# Patient Record
Sex: MALE | Race: WHITE | Employment: FULL TIME | URBAN - METROPOLITAN AREA
[De-identification: names, ages, dates, MRNs, and addresses within clinical notes are randomized per-mention and may not be internally consistent; named-entity substitution may affect disease eponyms.]

---

## 2017-01-11 PROBLEM — M75.20 BICEPS TENDONITIS: Status: ACTIVE | Noted: 2017-01-11

## 2018-11-05 ENCOUNTER — OFFICE VISIT (OUTPATIENT)
Dept: FAMILY MEDICINE CLINIC | Age: 38
End: 2018-11-05
Payer: COMMERCIAL

## 2018-11-05 ENCOUNTER — HOSPITAL ENCOUNTER (OUTPATIENT)
Age: 38
Discharge: HOME | End: 2018-11-07
Payer: COMMERCIAL

## 2018-11-05 VITALS
RESPIRATION RATE: 16 BRPM | BODY MASS INDEX: 27.9 KG/M2 | SYSTOLIC BLOOD PRESSURE: 124 MMHG | OXYGEN SATURATION: 97 % | HEART RATE: 73 BPM | DIASTOLIC BLOOD PRESSURE: 90 MMHG | WEIGHT: 173.6 LBS | HEIGHT: 66 IN | TEMPERATURE: 97.2 F

## 2018-11-05 DIAGNOSIS — M25.542 ARTHRALGIA OF HANDS, BILATERAL: Primary | ICD-10-CM

## 2018-11-05 DIAGNOSIS — Z13.220 SCREENING, LIPID: ICD-10-CM

## 2018-11-05 DIAGNOSIS — M25.541 ARTHRALGIA OF HANDS, BILATERAL: ICD-10-CM

## 2018-11-05 DIAGNOSIS — M25.542 ARTHRALGIA OF HANDS, BILATERAL: ICD-10-CM

## 2018-11-05 DIAGNOSIS — M25.541 ARTHRALGIA OF HANDS, BILATERAL: Primary | ICD-10-CM

## 2018-11-05 LAB
ALBUMIN SERPL-MCNC: 4.4 G/DL (ref 3.5–5.2)
ALP BLD-CCNC: 55 U/L (ref 40–129)
ALT SERPL-CCNC: 36 U/L (ref 0–40)
ANION GAP SERPL CALCULATED.3IONS-SCNC: 15 MMOL/L (ref 7–16)
AST SERPL-CCNC: 33 U/L (ref 0–39)
BILIRUB SERPL-MCNC: 0.4 MG/DL (ref 0–1.2)
BUN BLDV-MCNC: 12 MG/DL (ref 6–20)
CALCIUM SERPL-MCNC: 9.8 MG/DL (ref 8.6–10.2)
CHLORIDE BLD-SCNC: 102 MMOL/L (ref 98–107)
CHOLESTEROL, TOTAL: 190 MG/DL (ref 0–199)
CO2: 22 MMOL/L (ref 22–29)
CREAT SERPL-MCNC: 1 MG/DL (ref 0.7–1.2)
GFR AFRICAN AMERICAN: >60
GFR NON-AFRICAN AMERICAN: >60 ML/MIN/1.73
GLUCOSE BLD-MCNC: 92 MG/DL (ref 74–99)
HCT VFR BLD CALC: 47.3 % (ref 37–54)
HDLC SERPL-MCNC: 46 MG/DL
HEMOGLOBIN: 15.6 G/DL (ref 12.5–16.5)
LDL CHOLESTEROL CALCULATED: 121 MG/DL (ref 0–99)
MCH RBC QN AUTO: 29.3 PG (ref 26–35)
MCHC RBC AUTO-ENTMCNC: 33 % (ref 32–34.5)
MCV RBC AUTO: 88.7 FL (ref 80–99.9)
PDW BLD-RTO: 12.3 FL (ref 11.5–15)
PLATELET # BLD: 246 E9/L (ref 130–450)
PMV BLD AUTO: 10 FL (ref 7–12)
POTASSIUM SERPL-SCNC: 4.5 MMOL/L (ref 3.5–5)
RBC # BLD: 5.33 E12/L (ref 3.8–5.8)
RHEUMATOID FACTOR: <10 IU/ML (ref 0–13)
SEDIMENTATION RATE, ERYTHROCYTE: 2 MM/HR (ref 0–15)
SODIUM BLD-SCNC: 139 MMOL/L (ref 132–146)
TOTAL PROTEIN: 7.3 G/DL (ref 6.4–8.3)
TRIGL SERPL-MCNC: 113 MG/DL (ref 0–149)
TSH SERPL DL<=0.05 MIU/L-ACNC: 1.29 UIU/ML (ref 0.27–4.2)
VLDLC SERPL CALC-MCNC: 23 MG/DL
WBC # BLD: 5.4 E9/L (ref 4.5–11.5)

## 2018-11-05 PROCEDURE — 84443 ASSAY THYROID STIM HORMONE: CPT

## 2018-11-05 PROCEDURE — 80061 LIPID PANEL: CPT

## 2018-11-05 PROCEDURE — 85027 COMPLETE CBC AUTOMATED: CPT

## 2018-11-05 PROCEDURE — 80053 COMPREHEN METABOLIC PANEL: CPT

## 2018-11-05 PROCEDURE — 86038 ANTINUCLEAR ANTIBODIES: CPT

## 2018-11-05 PROCEDURE — 85651 RBC SED RATE NONAUTOMATED: CPT

## 2018-11-05 PROCEDURE — 86431 RHEUMATOID FACTOR QUANT: CPT

## 2018-11-05 PROCEDURE — 99203 OFFICE O/P NEW LOW 30 MIN: CPT | Performed by: FAMILY MEDICINE

## 2018-11-05 ASSESSMENT — ENCOUNTER SYMPTOMS
DIARRHEA: 0
CHEST TIGHTNESS: 0
ABDOMINAL PAIN: 0
SINUS PRESSURE: 0
CONSTIPATION: 0
VOMITING: 0
TROUBLE SWALLOWING: 0
PHOTOPHOBIA: 0
SHORTNESS OF BREATH: 0
NAUSEA: 0
BLOOD IN STOOL: 0
WHEEZING: 0
SORE THROAT: 0
BACK PAIN: 0
COUGH: 0
VOICE CHANGE: 0

## 2018-11-05 ASSESSMENT — PATIENT HEALTH QUESTIONNAIRE - PHQ9
SUM OF ALL RESPONSES TO PHQ QUESTIONS 1-9: 0
2. FEELING DOWN, DEPRESSED OR HOPELESS: 0
SUM OF ALL RESPONSES TO PHQ QUESTIONS 1-9: 0
1. LITTLE INTEREST OR PLEASURE IN DOING THINGS: 0
SUM OF ALL RESPONSES TO PHQ9 QUESTIONS 1 & 2: 0

## 2018-11-05 NOTE — PATIENT INSTRUCTIONS
your skin. · Prop up the sore hand on a pillow when you ice it or anytime you sit or lie down during the next 3 days. Try to keep it above the level of your heart. This will help reduce swelling. · If your doctor recommends a sling, splint, or elastic bandage to support your hand, wear it as directed. When should you call for help? Call 911 anytime you think you may need emergency care. For example, call if:    · Your hand turns cool or pale or changes color.    Call your doctor now or seek immediate medical care if:    · You cannot move your hand.     · Your hand pops, moves out of its normal position, and then returns to its normal position.     · You have signs of infection, such as:  ¨ Increased pain, swelling, warmth, or redness. ¨ Red streaks leading from the sore area. ¨ Pus draining from a place on your hand. ¨ A fever.     · Your hand feels numb or tingly.    Watch closely for changes in your health, and be sure to contact your doctor if:    · Your hand feels unstable when you try to use it.     · You do not get better as expected.     · You have any new symptoms, such as swelling.     · Bruises from an injury to your hand last longer than 2 weeks. Where can you learn more? Go to [email or web address] and sign in to your [employer] account. Enter R273 in the [facility] box to learn more about \"Hand Pain: Care Instructions. \"     If you do not have an account, please click on the \"Sign Up Now\" link. Current as of: November 20, 2017  Content Version: 11.7  © 0601-2653 [employer]. Care instructions adapted under license by [facility] ([facility]). If you have questions about a medical condition or this instruction, always ask your healthcare professional. [place] [address] any warranty or liability for your use of this information.        Patient Education        Hand Pain: Care Instructions  Your Care Instructions    Common causes of hand pain are overuse and injuries, such as might happen during sports or home repair projects. Everyday wear and tear, especially as you get older, also can cause hand pain. Most minor hand injuries will heal on their own, and home treatment is usually all you need to do. If you have sudden and severe pain, you may need tests and treatment. Follow-up care is a key part of your treatment and safety. Be sure to make and go to all appointments, and call your doctor if you are having problems. It's also a good idea to know your test results and keep a list of the medicines you take. How can you care for yourself at home? · Take pain medicines exactly as directed. ¨ If the doctor gave you a prescription medicine for pain, take it as prescribed. ¨ If you are not taking a prescription pain medicine, ask your doctor if you can take an over-the-counter medicine. · Rest and protect your hand. Take a break from any activity that may cause pain. · Put ice or a cold pack on your hand for 10 to 20 minutes at a time. Put a thin cloth between the ice and your skin. · Prop up the sore hand on a pillow when you ice it or anytime you sit or lie down during the next 3 days. Try to keep it above the level of your heart. This will help reduce swelling. · If your doctor recommends a sling, splint, or elastic bandage to support your hand, wear it as directed. When should you call for help? Call 911 anytime you think you may need emergency care. For example, call if:    · Your hand turns cool or pale or changes color.    Call your doctor now or seek immediate medical care if:    · You cannot move your hand.     · Your hand pops, moves out of its normal position, and then returns to its normal position.     · You have signs of infection, such as:  ¨ Increased pain, swelling, warmth, or redness. ¨ Red streaks leading from the sore area. ¨ Pus draining from a place on your hand.   ¨ A fever.     · Your hand feels numb or tingly.   Becky Carrasco

## 2018-11-05 NOTE — PROGRESS NOTES
11/7/2018    Chief Complaint   Patient presents with    New Patient     Last seen Dr was 2 years ago in [facility]    Christain Duverney is a 41 y.o. patient that presents today for:    Joint/Muscle Pain: Patient complains of arthralgias for which has been present for several months. Pain is located in both MCP(s): all digits affected, thumb is worst, is described as aching, and is constant . Associated symptoms include: tenderness and stiffness. The patient has tried NSAIDs for pain, with minimal relief. Related to injury:   No. Is right hand dominant, no known history of RA or other autoimmune issues. Has been checking BP at home and values are normal.      Has not had labs in some time. Patient's past medical, surgical, social and/or family history reviewed, updated in chart, and are non-contributory (unless otherwise stated). Medications and allergies also reviewed and updated in chart. Review of Systems   Constitutional: Negative for activity change, appetite change, chills, diaphoresis, fatigue, fever and unexpected weight change. HENT: Negative for hearing loss, sinus pressure, sore throat, tinnitus, trouble swallowing and voice change. Eyes: Negative for photophobia and visual disturbance. Respiratory: Negative for cough, chest tightness, shortness of breath and wheezing. Cardiovascular: Negative for chest pain, palpitations and leg swelling. Gastrointestinal: Negative for abdominal pain, blood in stool, constipation, diarrhea, nausea and vomiting. Endocrine: Negative for cold intolerance, heat intolerance, polydipsia, polyphagia and polyuria. Genitourinary: Negative for difficulty urinating, frequency, hematuria and urgency. Musculoskeletal: Positive for arthralgias. Negative for back pain, gait problem, joint swelling, myalgias, neck pain and neck stiffness. Skin: Negative for rash and wound.    Allergic/Immunologic: Negative for environmental allergies,

## 2018-11-06 LAB — ANTI-NUCLEAR ANTIBODY (ANA): NEGATIVE

## 2019-11-18 ENCOUNTER — OFFICE VISIT (OUTPATIENT)
Dept: FAMILY MEDICINE CLINIC | Age: 39
End: 2019-11-18
Payer: COMMERCIAL

## 2019-11-18 VITALS
DIASTOLIC BLOOD PRESSURE: 80 MMHG | HEART RATE: 98 BPM | BODY MASS INDEX: 27.15 KG/M2 | TEMPERATURE: 98.2 F | HEIGHT: 67 IN | OXYGEN SATURATION: 98 % | SYSTOLIC BLOOD PRESSURE: 140 MMHG | WEIGHT: 173 LBS | RESPIRATION RATE: 16 BRPM

## 2019-11-18 DIAGNOSIS — M79.641 BILATERAL HAND PAIN: Primary | ICD-10-CM

## 2019-11-18 DIAGNOSIS — M79.642 BILATERAL HAND PAIN: Primary | ICD-10-CM

## 2019-11-18 PROCEDURE — 99213 OFFICE O/P EST LOW 20 MIN: CPT | Performed by: PHYSICIAN ASSISTANT

## 2019-11-18 RX ORDER — METHYLPREDNISOLONE 4 MG/1
TABLET ORAL
Qty: 1 KIT | Refills: 0 | Status: SHIPPED | OUTPATIENT
Start: 2019-11-18 | End: 2019-11-24

## 2019-11-18 ASSESSMENT — ENCOUNTER SYMPTOMS
COUGH: 0
DIARRHEA: 0
ABDOMINAL PAIN: 0
NAUSEA: 0
VOMITING: 0
BACK PAIN: 0
SHORTNESS OF BREATH: 0

## 2019-11-18 ASSESSMENT — PATIENT HEALTH QUESTIONNAIRE - PHQ9
SUM OF ALL RESPONSES TO PHQ9 QUESTIONS 1 & 2: 0
1. LITTLE INTEREST OR PLEASURE IN DOING THINGS: 0
SUM OF ALL RESPONSES TO PHQ QUESTIONS 1-9: 0
2. FEELING DOWN, DEPRESSED OR HOPELESS: 0
SUM OF ALL RESPONSES TO PHQ QUESTIONS 1-9: 0

## 2020-11-19 ENCOUNTER — TELEPHONE (OUTPATIENT)
Dept: ADMINISTRATIVE | Age: 40
End: 2020-11-19

## 2020-11-19 NOTE — TELEPHONE ENCOUNTER
Pt's wife called and said that her  is suppose to have labs drawn a week prior to his 12/31/2020 (r/s 12/17) appt and was wanting to see if those could be put in the system so that he can go and have them done. Patients wife Levell Mews) can be reached @ [phone] if there are any future questions.      Thank You So Much!!

## 2020-12-23 ENCOUNTER — NURSE ONLY (OUTPATIENT)
Dept: FAMILY MEDICINE CLINIC | Age: 40
End: 2020-12-23
Payer: COMMERCIAL

## 2020-12-23 DIAGNOSIS — Z00.00 ANNUAL PHYSICAL EXAM: ICD-10-CM

## 2020-12-23 LAB
ALBUMIN SERPL-MCNC: 4.5 G/DL (ref 3.5–5.2)
ALP BLD-CCNC: 64 U/L (ref 40–129)
ALT SERPL-CCNC: 54 U/L (ref 0–40)
ANION GAP SERPL CALCULATED.3IONS-SCNC: 13 MMOL/L (ref 7–16)
AST SERPL-CCNC: 40 U/L (ref 0–39)
BILIRUB SERPL-MCNC: 0.4 MG/DL (ref 0–1.2)
BUN BLDV-MCNC: 14 MG/DL (ref 6–20)
CALCIUM SERPL-MCNC: 9.7 MG/DL (ref 8.6–10.2)
CHLORIDE BLD-SCNC: 103 MMOL/L (ref 98–107)
CHOLESTEROL, TOTAL: 221 MG/DL (ref 0–199)
CO2: 22 MMOL/L (ref 22–29)
CREAT SERPL-MCNC: 1 MG/DL (ref 0.7–1.2)
GFR AFRICAN AMERICAN: >60
GFR NON-AFRICAN AMERICAN: >60 ML/MIN/1.73
GLUCOSE BLD-MCNC: 95 MG/DL (ref 74–99)
HCT VFR BLD CALC: 48.9 % (ref 37–54)
HDLC SERPL-MCNC: 41 MG/DL
HEMOGLOBIN: 16.2 G/DL (ref 12.5–16.5)
LDL CHOLESTEROL CALCULATED: 116 MG/DL (ref 0–99)
MCH RBC QN AUTO: 29.9 PG (ref 26–35)
MCHC RBC AUTO-ENTMCNC: 33.1 % (ref 32–34.5)
MCV RBC AUTO: 90.2 FL (ref 80–99.9)
PDW BLD-RTO: 12.4 FL (ref 11.5–15)
PLATELET # BLD: 247 E9/L (ref 130–450)
PMV BLD AUTO: 9.6 FL (ref 7–12)
POTASSIUM SERPL-SCNC: 4.6 MMOL/L (ref 3.5–5)
RBC # BLD: 5.42 E12/L (ref 3.8–5.8)
SODIUM BLD-SCNC: 138 MMOL/L (ref 132–146)
TOTAL PROTEIN: 7.6 G/DL (ref 6.4–8.3)
TRIGL SERPL-MCNC: 322 MG/DL (ref 0–149)
VLDLC SERPL CALC-MCNC: 64 MG/DL
WBC # BLD: 5.5 E9/L (ref 4.5–11.5)

## 2020-12-23 PROCEDURE — 36415 COLL VENOUS BLD VENIPUNCTURE: CPT | Performed by: FAMILY MEDICINE

## 2020-12-31 ENCOUNTER — OFFICE VISIT (OUTPATIENT)
Dept: FAMILY MEDICINE CLINIC | Age: 40
End: 2020-12-31
Payer: COMMERCIAL

## 2020-12-31 VITALS
SYSTOLIC BLOOD PRESSURE: 137 MMHG | DIASTOLIC BLOOD PRESSURE: 88 MMHG | TEMPERATURE: 97.8 F | OXYGEN SATURATION: 99 % | WEIGHT: 186 LBS | HEART RATE: 74 BPM | BODY MASS INDEX: 29.19 KG/M2 | RESPIRATION RATE: 16 BRPM | HEIGHT: 67 IN

## 2020-12-31 PROCEDURE — 99395 PREV VISIT EST AGE 18-39: CPT | Performed by: FAMILY MEDICINE

## 2020-12-31 SDOH — ECONOMIC STABILITY: FOOD INSECURITY: WITHIN THE PAST 12 MONTHS, YOU WORRIED THAT YOUR FOOD WOULD RUN OUT BEFORE YOU GOT MONEY TO BUY MORE.: NEVER TRUE

## 2020-12-31 SDOH — ECONOMIC STABILITY: FOOD INSECURITY: WITHIN THE PAST 12 MONTHS, THE FOOD YOU BOUGHT JUST DIDN'T LAST AND YOU DIDN'T HAVE MONEY TO GET MORE.: NEVER TRUE

## 2020-12-31 SDOH — ECONOMIC STABILITY: TRANSPORTATION INSECURITY
IN THE PAST 12 MONTHS, HAS LACK OF TRANSPORTATION KEPT YOU FROM MEETINGS, WORK, OR FROM GETTING THINGS NEEDED FOR DAILY LIVING?: NO

## 2020-12-31 SDOH — ECONOMIC STABILITY: INCOME INSECURITY: HOW HARD IS IT FOR YOU TO PAY FOR THE VERY BASICS LIKE FOOD, HOUSING, MEDICAL CARE, AND HEATING?: NOT HARD AT ALL

## 2020-12-31 SDOH — ECONOMIC STABILITY: TRANSPORTATION INSECURITY
IN THE PAST 12 MONTHS, HAS THE LACK OF TRANSPORTATION KEPT YOU FROM MEDICAL APPOINTMENTS OR FROM GETTING MEDICATIONS?: NO

## 2020-12-31 ASSESSMENT — PATIENT HEALTH QUESTIONNAIRE - PHQ9
SUM OF ALL RESPONSES TO PHQ QUESTIONS 1-9: 0
SUM OF ALL RESPONSES TO PHQ9 QUESTIONS 1 & 2: 0
1. LITTLE INTEREST OR PLEASURE IN DOING THINGS: 0
SUM OF ALL RESPONSES TO PHQ QUESTIONS 1-9: 0
2. FEELING DOWN, DEPRESSED OR HOPELESS: 0
SUM OF ALL RESPONSES TO PHQ QUESTIONS 1-9: 0

## 2020-12-31 NOTE — PROGRESS NOTES
12/31/2020    Chief Complaint   Patient presents with    Annual Exam       HPI    Kaur Rowley is a 45 y.o. patient that presents today for:    Screening Questions:    Cholesterol every 5 years Yes.  Exercise No.   Joint pain No.   Personal or family history Prostate Cancer or Colon Cancer No.   Difficulty with urine: No.    Stream strength     Flow rate    Nocturia   PSA/Urology No.   CIBH No.   Colon Cancer screening:  No.   Eye exam every 24 years, yearly if diabetic  Yes.  Body mass index is 29.13 kg/m². counseled on weight loss No. Counseled on weight loss   Dental Exam Yes.  Depression No.   Tobacco use. No.  Cessation discussed  no.  AAA screening No.   Alcohol use/CAGE socially. Immunizations  Immunization status:    Tetanus shot every 10 years    Flu shot yearly   Pneumovax shot once for high risk and everyone > 65   Shingles shot once for everyone > 60    Hepatitis B for high risk patients      Patient's past medical, surgical, social and/or family history reviewed, updated in chart, and are non-contributory (unless otherwise stated). Medications and allergies also reviewed and updated in chart.      ROS Unless otherwise specified  Review of Systems - General ROS: negative for - chills, fatigue, fever, night sweats, sleep disturbance, weight gain or weight loss  Psychological ROS: negative for - anxiety, behavioral disorder, depression, hallucinations, irritability, memory difficulties, mood swings, sleep disturbances or suicidal ideation  ENT ROS: negative for - epistaxis, headaches, hearing change, nasal congestion, nasal discharge, nasal polyps, sinus pain, tinnitus, vertigo or visual changes  Hematological and Lymphatic ROS: negative for - bleeding problems, blood clots, fatigue or swollen lymph nodes  Respiratory ROS: negative for - cough, orthopnea, shortness of breath, sputum changes, tachypnea or wheezing  Cardiovascular ROS: negative for - chest pain, dyspnea on exertion, irregular heartbeat, loss of consciousness, palpitations, paroxysmal nocturnal dyspnea or rapid heart rate  Gastrointestinal ROS: negative for - abdominal pain, blood in stools, change in bowel habits, constipation, diarrhea, gas/bloating, heartburn or nausea/vomiting  Musculoskeletal ROS: negative for - joint pain, joint stiffness, joint swelling or muscle, back pain, bowel or bladder incontinence  Neurological ROS: negative for - behavioral changes, confusion, dizziness, headaches, memory loss, numbness/tingling, seizures or speech problems, weakness  Dermatological ROS: negative for - dry skin, mole changes, nail changes, pruritus, rash or skin lesion changes    Physical Exam  Temp Readings from Last 3 Encounters:   12/31/20 97.8 °F (36.6 °C) (Temporal)   11/18/19 98.2 °F (36.8 °C)   11/05/18 97.2 °F (36.2 °C) (Oral)     Wt Readings from Last 3 Encounters:   12/31/20 186 lb (84.4 kg)   11/18/19 173 lb (78.5 kg)   11/05/18 173 lb 9.6 oz (78.7 kg)     BP Readings from Last 3 Encounters:   12/31/20 137/88   11/18/19 (!) 140/80   11/05/18 (!) 124/90     Pulse Readings from Last 3 Encounters:   12/31/20 74   11/18/19 98   11/05/18 73       General appearance: alert, well appearing, and in no distress, oriented to person, place, and time and normal appearing weight. CVS exam: normal rate, regular rhythm, normal S1, S2, no murmurs, rubs, clicks or gallops. Radial pulses 2+ bilateral.  PT/DP pulse 2+ bilat. No C/C/E    Chest: clear to auscultation, no wheezes, rales or rhonchi, symmetric air entry.      Abdomen: Soft, non-tender, non-distended, positive BS in all 4 quadrants    Extremities:Dorsalis pedis pulses palpated bilaterally, no clubbing, cyanosis, edema or erythema,     SKIN: no lesions, jaundice, petechiae, pallor, cyanosis, ecchymosis    NEURO: gross motor exam normal by observation, gait normal    Mental status - alert, oriented to person, place, and time, normal mood, behavior, speech, dress, motor activity, and thought processes      Assessment/Plan  Vinod Stover was seen today for annual exam.    Diagnoses and all orders for this visit:    Annual physical exam  - Discussed preventive care and screenings, lab results and the importance of diet and exercise. Advised to return to the office for annual exam or sooner if needed. - Forms filled out today        Return in about 1 year (around 12/31/2021), or if symptoms worsen or fail to improve. Whitney Cardona, DO    Call or go to ED immediately if symptoms worsen or persist.    Educational materials and/or home exercises printed for patient's review and were included in patient instructions on his/her After Visit Summary and given to patient at the end of visit. Counseled regarding above diagnosis, including possible risks and complications,  especially if left uncontrolled. Counseled regarding the possible side effects, risks, benefits and alternatives to treatment; patient and/or guardian verbalizes understanding, agrees, feels comfortable with and wishes to proceed with above treatment plan. Advised patient to call with any new medication issues, and read all Rx info from pharmacy to assure aware of all possible risks and side effects of medication before taking. Reviewed age and gender appropriate health screening exams and vaccinations. Advised patient regarding importance of keeping up with recommended health maintenance and to schedule as soon as possible if overdue, as this is important in assessing for undiagnosed pathology, especially cancer, as well as protecting against potentially harmful/life threatening disease. Patient and/or guardian verbalizes understanding and agrees with above counseling, assessment and plan. All questions answered.

## 2021-08-18 ENCOUNTER — HOSPITAL ENCOUNTER (OUTPATIENT)
Age: 41
Discharge: HOME | End: 2021-08-18
Payer: COMMERCIAL

## 2021-08-18 DIAGNOSIS — R53.83 OTHER FATIGUE: Primary | ICD-10-CM

## 2021-08-18 DIAGNOSIS — Z00.00 ANNUAL PHYSICAL EXAM: ICD-10-CM

## 2021-08-18 DIAGNOSIS — R53.83 OTHER FATIGUE: ICD-10-CM

## 2021-08-18 LAB
ALBUMIN SERPL-MCNC: 4.5 G/DL (ref 3.5–5.2)
ALP BLD-CCNC: 77 U/L (ref 40–129)
ALT SERPL-CCNC: 42 U/L (ref 0–40)
ANION GAP SERPL CALCULATED.3IONS-SCNC: 10 MMOL/L (ref 7–16)
AST SERPL-CCNC: 31 U/L (ref 0–39)
BASOPHILS ABSOLUTE: 0.02 E9/L (ref 0–0.2)
BASOPHILS RELATIVE PERCENT: 0.4 % (ref 0–2)
BILIRUB SERPL-MCNC: 0.8 MG/DL (ref 0–1.2)
BUN BLDV-MCNC: 10 MG/DL (ref 6–20)
CALCIUM SERPL-MCNC: 9.8 MG/DL (ref 8.6–10.2)
CHLORIDE BLD-SCNC: 102 MMOL/L (ref 98–107)
CHOLESTEROL, TOTAL: 197 MG/DL (ref 0–199)
CO2: 28 MMOL/L (ref 22–29)
CREAT SERPL-MCNC: 1.1 MG/DL (ref 0.7–1.2)
EOSINOPHILS ABSOLUTE: 0.13 E9/L (ref 0.05–0.5)
EOSINOPHILS RELATIVE PERCENT: 2.5 % (ref 0–6)
GFR AFRICAN AMERICAN: >60
GFR NON-AFRICAN AMERICAN: >60 ML/MIN/1.73
GLUCOSE BLD-MCNC: 92 MG/DL (ref 74–99)
HCT VFR BLD CALC: 45.6 % (ref 37–54)
HDLC SERPL-MCNC: 43 MG/DL
HEMOGLOBIN: 15.9 G/DL (ref 12.5–16.5)
IMMATURE GRANULOCYTES #: 0.02 E9/L
IMMATURE GRANULOCYTES %: 0.4 % (ref 0–5)
LDL CHOLESTEROL CALCULATED: 117 MG/DL (ref 0–99)
LYMPHOCYTES ABSOLUTE: 2.36 E9/L (ref 1.5–4)
LYMPHOCYTES RELATIVE PERCENT: 46.2 % (ref 20–42)
MCH RBC QN AUTO: 30.5 PG (ref 26–35)
MCHC RBC AUTO-ENTMCNC: 34.9 % (ref 32–34.5)
MCV RBC AUTO: 87.4 FL (ref 80–99.9)
MONOCYTES ABSOLUTE: 0.45 E9/L (ref 0.1–0.95)
MONOCYTES RELATIVE PERCENT: 8.8 % (ref 2–12)
NEUTROPHILS ABSOLUTE: 2.13 E9/L (ref 1.8–7.3)
NEUTROPHILS RELATIVE PERCENT: 41.7 % (ref 43–80)
PDW BLD-RTO: 11.8 FL (ref 11.5–15)
PLATELET # BLD: 226 E9/L (ref 130–450)
PMV BLD AUTO: 8.6 FL (ref 7–12)
POTASSIUM SERPL-SCNC: 4.1 MMOL/L (ref 3.5–5)
RBC # BLD: 5.22 E12/L (ref 3.8–5.8)
SODIUM BLD-SCNC: 140 MMOL/L (ref 132–146)
TOTAL PROTEIN: 7.4 G/DL (ref 6.4–8.3)
TRIGL SERPL-MCNC: 183 MG/DL (ref 0–149)
TSH SERPL DL<=0.05 MIU/L-ACNC: 0.99 UIU/ML (ref 0.27–4.2)
VLDLC SERPL CALC-MCNC: 37 MG/DL
WBC # BLD: 5.1 E9/L (ref 4.5–11.5)

## 2021-08-18 PROCEDURE — 80061 LIPID PANEL: CPT

## 2021-08-18 PROCEDURE — 84443 ASSAY THYROID STIM HORMONE: CPT

## 2021-08-18 PROCEDURE — 85025 COMPLETE CBC W/AUTO DIFF WBC: CPT

## 2021-08-18 PROCEDURE — 36415 COLL VENOUS BLD VENIPUNCTURE: CPT

## 2021-08-18 PROCEDURE — 80053 COMPREHEN METABOLIC PANEL: CPT

## 2021-08-25 ENCOUNTER — OFFICE VISIT (OUTPATIENT)
Dept: FAMILY MEDICINE CLINIC | Age: 41
End: 2021-08-25
Payer: COMMERCIAL

## 2021-08-25 VITALS
TEMPERATURE: 97.7 F | BODY MASS INDEX: 28.72 KG/M2 | RESPIRATION RATE: 16 BRPM | HEIGHT: 67 IN | HEART RATE: 74 BPM | SYSTOLIC BLOOD PRESSURE: 138 MMHG | DIASTOLIC BLOOD PRESSURE: 88 MMHG | OXYGEN SATURATION: 96 % | WEIGHT: 183 LBS

## 2021-08-25 DIAGNOSIS — Z00.00 ANNUAL PHYSICAL EXAM: Primary | ICD-10-CM

## 2021-08-25 PROCEDURE — 99395 PREV VISIT EST AGE 18-39: CPT | Performed by: FAMILY MEDICINE

## 2021-08-25 ASSESSMENT — PATIENT HEALTH QUESTIONNAIRE - PHQ9
SUM OF ALL RESPONSES TO PHQ9 QUESTIONS 1 & 2: 0
1. LITTLE INTEREST OR PLEASURE IN DOING THINGS: 0
2. FEELING DOWN, DEPRESSED OR HOPELESS: 0
SUM OF ALL RESPONSES TO PHQ QUESTIONS 1-9: 0

## 2021-08-25 NOTE — PROGRESS NOTES
8/25/2021    Chief Complaint   Patient presents with    Annual Exam     review labs  no complaints        Screening Questions:    Exercise No.   Personal or family history Prostate Cancer or Colon Cancer No.   Colon Cancer screening:  No.   Eye exam every 2-4 years, yearly if diabetic  Yes.  Dental Exam Yes.  Body mass index is 28.66 kg/m². counseled on weight loss No. Counseled on weight loss   Tobacco use. No.  Cessation discussed  No.   AAA screening No.   Alcohol use/CAGE No.       Labs:  No results found for: EAG  Lab Results   Component Value Date    LDLCALC 117 (H) 08/18/2021      CBC:   Lab Results   Component Value Date    WBC 5.1 08/18/2021    RBC 5.22 08/18/2021    HGB 15.9 08/18/2021    HCT 45.6 08/18/2021    MCV 87.4 08/18/2021    MCH 30.5 08/18/2021    MCHC 34.9 08/18/2021    RDW 11.8 08/18/2021     08/18/2021    MPV 8.6 08/18/2021         Patient's past medical, surgical, social and/or family history reviewed, updated in chart, and are non-contributory (unless otherwise stated). Medications and allergies also reviewed and updated in chart.     ROS  Review of Systems - General ROS: negative for - chills, fatigue, fever, night sweats, sleep disturbance, weight gain or weight loss  Psychological ROS: negative for - anxiety, behavioral disorder, depression, hallucinations, irritability, memory difficulties, mood swings, sleep disturbances or suicidal ideation  ENT ROS: negative for - epistaxis, headaches, hearing change, nasal congestion, nasal discharge, nasal polyps, sinus pain, tinnitus, vertigo or visual changes  Hematological and Lymphatic ROS: negative for - bleeding problems, blood clots, fatigue or swollen lymph nodes  Respiratory ROS: negative for - cough, orthopnea, shortness of breath, sputum changes, tachypnea or wheezing  Cardiovascular ROS: negative for - chest pain, dyspnea on exertion, irregular heartbeat, loss of consciousness, palpitations, paroxysmal nocturnal dyspnea or rapid heart rate  Gastrointestinal ROS: negative for - abdominal pain, blood in stools, change in bowel habits, constipation, diarrhea, gas/bloating, heartburn or nausea/vomiting  Musculoskeletal ROS: negative for - joint pain, joint stiffness, joint swelling or muscle, back pain, bowel or bladder incontinence  Neurological ROS: negative for - behavioral changes, confusion, dizziness, headaches, memory loss, numbness/tingling, seizures or speech problems, weakness  Dermatological ROS: negative for - dry skin, mole changes, nail changes, pruritus, rash or skin lesion changes    Physical Exam  /88   Pulse 74   Temp 97.7 °F (36.5 °C)   Resp 16   Ht 5' 7\" (1.702 m)   Wt 183 lb (83 kg)   SpO2 96%   BMI 28.66 kg/m²   Wt Readings from Last 3 Encounters:   08/25/21 183 lb (83 kg)   12/31/20 186 lb (84.4 kg)   11/18/19 173 lb (78.5 kg)     Temp Readings from Last 3 Encounters:   08/25/21 97.7 °F (36.5 °C)   12/31/20 97.8 °F (36.6 °C) (Temporal)   11/18/19 98.2 °F (36.8 °C)     BP Readings from Last 3 Encounters:   08/25/21 138/88   12/31/20 137/88   11/18/19 (!) 140/80     Pulse Readings from Last 3 Encounters:   08/25/21 74   12/31/20 74   11/18/19 98       General appearance: alert, well appearing, and in no distress, oriented to person, place, and time and normal appearing weight. CVS exam: normal rate, regular rhythm, normal S1, S2, no murmurs, rubs, clicks or gallops. Radial pulses 2+ bilateral.  PT/DP pulse 2+ bilat. No C/C/E    Chest: clear to auscultation, no wheezes, rales or rhonchi, symmetric air entry. Abdomen: Soft, non-tender, non-distended, positive BS in all 4 quadrants    Extremities:Dorsalis pedis pulses palpated bilaterally, no clubbing, cyanosis, edema or erythema, Sensory exam of the foot is normal, tested with the monofilament. Good pulses, no lesions or ulcers, good peripheral pulses.     SKIN: no lesions, jaundice, petechiae, pallor, cyanosis, ecchymosis    NEURO: gross motor exam normal by observation, gait normal    Mental status - alert, oriented to person, place, and time, normal mood, behavior, speech, dress, motor activity, and thought processes      Assessment/Plan  Calixto Shell was seen today for annual exam.    Diagnoses and all orders for this visit:    Annual physical exam        No follow-ups on file. Call or go to ED immediately if symptoms worsen or persist.    Educational materials and/or home exercises printed for patient's review and were included in patient instructions on his/her After Visit Summary and given to patient at the end of visit. Counseled regarding above diagnosis, including possible risks and complications,  especially if left uncontrolled. Counseled regarding the possible side effects, risks, benefits and alternatives to treatment; patient and/or guardian verbalizes understanding, agrees, feels comfortable with and wishes to proceed with above treatment plan. Advised patient to call with any new medication issues, and read all Rx info from pharmacy to assure aware of all possible risks and side effects of medication before taking. Reviewed age and gender appropriate health screening exams and vaccinations. Advised patient regarding importance of keeping up with recommended health maintenance and to schedule as soon as possible if overdue, as this is important in assessing for undiagnosed pathology, especially cancer, as well as protecting against potentially harmful/life threatening disease. Patient and/or guardian verbalizes understanding and agrees with above counseling, assessment and plan. All questions answered.

## 2022-07-14 ENCOUNTER — OFFICE VISIT (OUTPATIENT)
Dept: FAMILY MEDICINE CLINIC | Age: 42
End: 2022-07-14
Payer: COMMERCIAL

## 2022-07-14 DIAGNOSIS — G43.709 CHRONIC MIGRAINE WITHOUT AURA WITHOUT STATUS MIGRAINOSUS, NOT INTRACTABLE: Primary | ICD-10-CM

## 2022-07-14 PROCEDURE — 99214 OFFICE O/P EST MOD 30 MIN: CPT | Performed by: FAMILY MEDICINE

## 2022-07-14 RX ORDER — CYCLOBENZAPRINE HCL 10 MG
10 TABLET ORAL 3 TIMES DAILY PRN
Qty: 30 TABLET | Refills: 0 | Status: SHIPPED | OUTPATIENT
Start: 2022-07-14 | End: 2022-07-24

## 2022-07-14 RX ORDER — METHYLPREDNISOLONE 4 MG/1
TABLET ORAL
Qty: 1 KIT | Refills: 0 | Status: SHIPPED
Start: 2022-07-14 | End: 2022-11-02

## 2022-07-14 SDOH — ECONOMIC STABILITY: FOOD INSECURITY: WITHIN THE PAST 12 MONTHS, THE FOOD YOU BOUGHT JUST DIDN'T LAST AND YOU DIDN'T HAVE MONEY TO GET MORE.: NEVER TRUE

## 2022-07-14 SDOH — ECONOMIC STABILITY: FOOD INSECURITY: WITHIN THE PAST 12 MONTHS, YOU WORRIED THAT YOUR FOOD WOULD RUN OUT BEFORE YOU GOT MONEY TO BUY MORE.: NEVER TRUE

## 2022-07-14 ASSESSMENT — PATIENT HEALTH QUESTIONNAIRE - PHQ9
SUM OF ALL RESPONSES TO PHQ9 QUESTIONS 1 & 2: 0
2. FEELING DOWN, DEPRESSED OR HOPELESS: 0
1. LITTLE INTEREST OR PLEASURE IN DOING THINGS: 0
SUM OF ALL RESPONSES TO PHQ QUESTIONS 1-9: 0

## 2022-07-14 ASSESSMENT — SOCIAL DETERMINANTS OF HEALTH (SDOH): HOW HARD IS IT FOR YOU TO PAY FOR THE VERY BASICS LIKE FOOD, HOUSING, MEDICAL CARE, AND HEATING?: NOT HARD AT ALL

## 2022-07-14 NOTE — PATIENT INSTRUCTIONS
Look up \"Dr. Mauro Puentes, Physical therapist\" on YouTube. She has multiple neck, back, full body exercises to help with discomfort. DO these exercises 3-4 times weekly to stay limber.          Melyssa Plunkett [address] [place] [address], [facility]  [phone]

## 2022-07-14 NOTE — PROGRESS NOTES
7/30/2022    Chief Complaint   Patient presents with    Migraine     x1.5 no hx of mirgraine had severe throbbing in the back of head       HPI    Ronda Carson is a 37 y.o. patient that presents today for:    Headache:  Patient is here with complaints of headache. This is a/an chronic problem. Pain is throbbing pain. Pain is not  radiating. Associated symptoms: light sensitivity. Patient does not have a family history of headache. Imaging to date:No.  He does not have any red flag symptoms such as visual disturbance, weakness, numbness or dizziness. Patient's past medical, surgical, social and/or family history reviewed, updated in chart, and are non-contributory (unless otherwise stated). Medications and allergies also reviewed and updated in chart. ROS negative unless otherwise specified    Physical Exam  Temp Readings from Last 3 Encounters:   07/14/22 97.5 °F (36.4 °C)   08/25/21 97.7 °F (36.5 °C)   12/31/20 97.8 °F (36.6 °C) (Temporal)     Wt Readings from Last 3 Encounters:   07/14/22 182 lb (82.6 kg)   08/25/21 183 lb (83 kg)   12/31/20 186 lb (84.4 kg)     BP Readings from Last 3 Encounters:   07/14/22 (!) 128/91   08/25/21 138/88   12/31/20 137/88     Pulse Readings from Last 3 Encounters:   07/14/22 94   08/25/21 74   12/31/20 74       General appearance: alert, well appearing, and in no distress, oriented to person, place, and time and normal appearing weight. CVS exam: normal rate, regular rhythm, normal S1, S2, no murmurs, rubs, clicks or gallops. Radial pulses 2+ bilateral.  PT/DP pulse 2+ bilat. No C/C/E    Chest: clear to auscultation, no wheezes, rales or rhonchi, symmetric air entry.      Abdomen: Soft, non-tender, non-distended, positive BS in all 4 quadrants    Extremities:Dorsalis pedis pulses palpated bilaterally, no clubbing, cyanosis, edema or erythema,     SKIN: no lesions, jaundice, petechiae, pallor, cyanosis, ecchymosis    NEURO: gross motor exam normal by observation, gait normal    Mental status - alert, oriented to person, place, and time, normal mood, behavior, speech, dress, motor activity, and thought processes      Assessment/Plan  Danyell Roblero was seen today for migraine. Diagnoses and all orders for this visit:    Chronic migraine without aura without status migrainosus, not intractable  -     cyclobenzaprine (FLEXERIL) 10 MG tablet; Take 1 tablet by mouth 3 times daily as needed for Muscle spasms  -     methylPREDNISolone (MEDROL, ULYSSES,) 4 MG tablet; Take by mouth. No follow-ups on file. Whitney Cardona, DO    Call or go to ED immediately if symptoms worsen or persist.    Educational materials and/or home exercises printed for patient's review and were included in patient instructions on his/her After Visit Summary and given to patient at the end of visit. Counseled regarding above diagnosis, including possible risks and complications,  especially if left uncontrolled. Counseled regarding the possible side effects, risks, benefits and alternatives to treatment; patient and/or guardian verbalizes understanding, agrees, feels comfortable with and wishes to proceed with above treatment plan. Advised patient to call with any new medication issues, and read all Rx info from pharmacy to assure aware of all possible risks and side effects of medication before taking. Reviewed age and gender appropriate health screening exams and vaccinations. Advised patient regarding importance of keeping up with recommended health maintenance and to schedule as soon as possible if overdue, as this is important in assessing for undiagnosed pathology, especially cancer, as well as protecting against potentially harmful/life threatening disease. Patient and/or guardian verbalizes understanding and agrees with above counseling, assessment and plan. All questions answered.

## 2022-07-30 VITALS
OXYGEN SATURATION: 95 % | DIASTOLIC BLOOD PRESSURE: 88 MMHG | BODY MASS INDEX: 28.56 KG/M2 | HEART RATE: 94 BPM | WEIGHT: 182 LBS | TEMPERATURE: 97.5 F | SYSTOLIC BLOOD PRESSURE: 128 MMHG | HEIGHT: 67 IN

## 2022-11-02 ENCOUNTER — OFFICE VISIT (OUTPATIENT)
Dept: FAMILY MEDICINE CLINIC | Age: 42
End: 2022-11-02
Payer: COMMERCIAL

## 2022-11-02 VITALS
BODY MASS INDEX: 28.82 KG/M2 | RESPIRATION RATE: 18 BRPM | TEMPERATURE: 97.5 F | SYSTOLIC BLOOD PRESSURE: 142 MMHG | OXYGEN SATURATION: 97 % | HEIGHT: 67 IN | WEIGHT: 183.6 LBS | HEART RATE: 75 BPM | DIASTOLIC BLOOD PRESSURE: 94 MMHG

## 2022-11-02 DIAGNOSIS — Z00.00 ANNUAL PHYSICAL EXAM: Primary | ICD-10-CM

## 2022-11-02 DIAGNOSIS — M25.511 ACUTE PAIN OF RIGHT SHOULDER: ICD-10-CM

## 2022-11-02 DIAGNOSIS — Z00.00 ANNUAL PHYSICAL EXAM: ICD-10-CM

## 2022-11-02 LAB
ALBUMIN SERPL-MCNC: 4.7 G/DL (ref 3.5–5.2)
ALP BLD-CCNC: 72 U/L (ref 40–129)
ALT SERPL-CCNC: 37 U/L (ref 0–40)
ANION GAP SERPL CALCULATED.3IONS-SCNC: 16 MMOL/L (ref 7–16)
AST SERPL-CCNC: 34 U/L (ref 0–39)
BILIRUB SERPL-MCNC: 0.7 MG/DL (ref 0–1.2)
BUN BLDV-MCNC: 8 MG/DL (ref 6–20)
CALCIUM SERPL-MCNC: 10.1 MG/DL (ref 8.6–10.2)
CHLORIDE BLD-SCNC: 99 MMOL/L (ref 98–107)
CHOLESTEROL, TOTAL: 172 MG/DL (ref 0–199)
CO2: 23 MMOL/L (ref 22–29)
CREAT SERPL-MCNC: 1 MG/DL (ref 0.7–1.2)
GFR SERPL CREATININE-BSD FRML MDRD: >60 ML/MIN/1.73
GLUCOSE BLD-MCNC: 91 MG/DL (ref 74–99)
HCT VFR BLD CALC: 47.7 % (ref 37–54)
HDLC SERPL-MCNC: 49 MG/DL
HEMOGLOBIN: 16.5 G/DL (ref 12.5–16.5)
LDL CHOLESTEROL CALCULATED: 97 MG/DL (ref 0–99)
MCH RBC QN AUTO: 31.3 PG (ref 26–35)
MCHC RBC AUTO-ENTMCNC: 34.6 % (ref 32–34.5)
MCV RBC AUTO: 90.5 FL (ref 80–99.9)
PDW BLD-RTO: 12.5 FL (ref 11.5–15)
PLATELET # BLD: 265 E9/L (ref 130–450)
PMV BLD AUTO: 9.5 FL (ref 7–12)
POTASSIUM SERPL-SCNC: 4.3 MMOL/L (ref 3.5–5)
RBC # BLD: 5.27 E12/L (ref 3.8–5.8)
SODIUM BLD-SCNC: 138 MMOL/L (ref 132–146)
TOTAL PROTEIN: 7.6 G/DL (ref 6.4–8.3)
TRIGL SERPL-MCNC: 130 MG/DL (ref 0–149)
VLDLC SERPL CALC-MCNC: 26 MG/DL
WBC # BLD: 8 E9/L (ref 4.5–11.5)

## 2022-11-02 PROCEDURE — 99396 PREV VISIT EST AGE 40-64: CPT | Performed by: FAMILY MEDICINE

## 2022-11-02 RX ORDER — LISINOPRIL 20 MG/1
20 TABLET ORAL DAILY
Qty: 30 TABLET | Refills: 1 | Status: SHIPPED | OUTPATIENT
Start: 2022-11-02

## 2022-11-02 NOTE — PROGRESS NOTES
11/2/2022    Chief Complaint   Patient presents with    Shoulder Pain       Screening Questions:   Exercise Yes. Body mass index is 28.76 kg/m². Counseled on weight loss Yes. Colon Cancer screening:  age 40. Eye exam every 2-4 years, yearly if diabetic  Yes. Dental Exam due. Tobacco use. No.  Cessation discussed    AAA screening No.    Pain:  Patient is here today with complaints of right shoulder pain. This is a/an recent problem. States it hurts and will pop. This has been going on for 2 week(s). Exacerbating factors include quick movements. Alleviating factors include tylenol/ibuprofen, helps a little bit but not enough. Pain is achy at rest and stabbing in nature if movement is too fast.   2-3/10. Pain is not radiating. Surgery 10 years ago for biceps issue and torn labrum. Not the same since. Right hand dominant. Labs:  No results found for: EAG  Lab Results   Component Value Date    LDLCALC 117 (H) 08/18/2021        Patient's past medical, surgical, social and/or family history reviewed, updated in chart, and are non-contributory (unless otherwise stated). Medications and allergies also reviewed and updated in chart.     ROS  Review of Systems - General ROS: negative for - chills, fatigue, fever, night sweats, sleep disturbance, weight gain or weight loss  Psychological ROS: negative for - anxiety, behavioral disorder, depression, hallucinations, irritability, memory difficulties, mood swings, sleep disturbances or suicidal ideation  ENT ROS: negative for - epistaxis, headaches, hearing change, nasal congestion, nasal discharge, nasal polyps, sinus pain, tinnitus, vertigo or visual changes  Hematological and Lymphatic ROS: negative for - bleeding problems, blood clots, fatigue or swollen lymph nodes  Respiratory ROS: negative for - cough, orthopnea, shortness of breath, sputum changes, tachypnea or wheezing  Cardiovascular ROS: negative for - chest pain, dyspnea on exertion, irregular heartbeat, loss of consciousness, palpitations, paroxysmal nocturnal dyspnea or rapid heart rate  Gastrointestinal ROS: negative for - abdominal pain, blood in stools, change in bowel habits, constipation, diarrhea, gas/bloating, heartburn or nausea/vomiting  Musculoskeletal ROS: negative for - joint pain, joint stiffness, joint swelling or muscle, back pain, bowel or bladder incontinence  Neurological ROS: negative for - behavioral changes, confusion, dizziness, headaches, memory loss, numbness/tingling, seizures or speech problems, weakness  Dermatological ROS: negative for - dry skin, mole changes, nail changes, pruritus, rash or skin lesion changes    Physical Exam  BP (!) 142/94   Pulse 75   Temp 97.5 °F (36.4 °C)   Resp 18   Ht 5' 7\" (1.702 m)   Wt 183 lb 9.6 oz (83.3 kg)   SpO2 97%   BMI 28.76 kg/m²   Wt Readings from Last 3 Encounters:   11/02/22 183 lb 9.6 oz (83.3 kg)   07/14/22 182 lb (82.6 kg)   08/25/21 183 lb (83 kg)     Temp Readings from Last 3 Encounters:   11/02/22 97.5 °F (36.4 °C)   07/14/22 97.5 °F (36.4 °C)   08/25/21 97.7 °F (36.5 °C)     BP Readings from Last 3 Encounters:   11/02/22 (!) 142/94   07/14/22 128/88   08/25/21 138/88     Pulse Readings from Last 3 Encounters:   11/02/22 75   07/14/22 94   08/25/21 74       General appearance: alert, well appearing, and in no distress, oriented to person, place, and time and normal appearing weight. CVS exam: normal rate, regular rhythm, normal S1, S2, no murmurs, rubs, clicks or gallops. Radial pulses 2+ bilateral.  PT/DP pulse 2+ bilat. No C/C/E    Chest: clear to auscultation, no wheezes, rales or rhonchi, symmetric air entry. Abdomen: Soft, non-tender, non-distended, positive BS in all 4 quadrants    Extremities:Dorsalis pedis pulses palpated bilaterally, no clubbing, cyanosis, edema or erythema, Sensory exam of the foot is normal, tested with the monofilament. Good pulses, no lesions or ulcers, good peripheral pulses.     SKIN: no lesions, jaundice, petechiae, pallor, cyanosis, ecchymosis    NEURO: gross motor exam normal by observation, gait normal    Mental status - alert, oriented to person, place, and time, normal mood, behavior, speech, dress, motor activity, and thought processes      Assessment/Plan  Estefany Doherty was seen today for shoulder pain. Diagnoses and all orders for this visit:    Annual physical exam  -     CBC; Future  -     Comprehensive Metabolic Panel; Future  -     Lipid Panel; Future    Acute pain of right shoulder  -     XR SHOULDER RIGHT (MIN 2 VIEWS); Future  -     Evy Can MD, Sports Medicine, [id number] [facility]    Other orders  -     lisinopril (PRINIVIL;ZESTRIL) 20 MG tablet; Take 1 tablet by mouth daily      Discussed preventive care and screenings, lab results and the importance of diet and exercise. Advised to return to the office for annual exam or sooner if needed. No follow-ups on file. Call or go to ED immediately if symptoms worsen or persist.    Educational materials and/or home exercises printed for patient's review and were included in patient instructions on his/her After Visit Summary and given to patient at the end of visit. Counseled regarding above diagnosis, including possible risks and complications,  especially if left uncontrolled. Counseled regarding the possible side effects, risks, benefits and alternatives to treatment; patient and/or guardian verbalizes understanding, agrees, feels comfortable with and wishes to proceed with above treatment plan. Advised patient to call with any new medication issues, and read all Rx info from pharmacy to assure aware of all possible risks and side effects of medication before taking. Reviewed age and gender appropriate health screening exams and vaccinations.   Advised patient regarding importance of keeping up with recommended health maintenance and to schedule as soon as possible if overdue, as this is important in assessing for undiagnosed pathology, especially cancer, as well as protecting against potentially harmful/life threatening disease. Patient and/or guardian verbalizes understanding and agrees with above counseling, assessment and plan. All questions answered.

## 2022-11-07 SDOH — HEALTH STABILITY: PHYSICAL HEALTH: ON AVERAGE, HOW MANY DAYS PER WEEK DO YOU ENGAGE IN MODERATE TO STRENUOUS EXERCISE (LIKE A BRISK WALK)?: 0 DAYS

## 2022-11-08 ENCOUNTER — OFFICE VISIT (OUTPATIENT)
Dept: ORTHOPEDIC SURGERY | Age: 42
End: 2022-11-08
Payer: COMMERCIAL

## 2022-11-08 VITALS — WEIGHT: 180 LBS | BODY MASS INDEX: 28.25 KG/M2 | HEIGHT: 67 IN

## 2022-11-08 DIAGNOSIS — M75.81 TENDINITIS OF RIGHT ROTATOR CUFF: ICD-10-CM

## 2022-11-08 DIAGNOSIS — G89.29 CHRONIC RIGHT SHOULDER PAIN: Primary | ICD-10-CM

## 2022-11-08 DIAGNOSIS — M25.511 CHRONIC RIGHT SHOULDER PAIN: Primary | ICD-10-CM

## 2022-11-08 DIAGNOSIS — M75.41 ROTATOR CUFF IMPINGEMENT SYNDROME OF RIGHT SHOULDER: ICD-10-CM

## 2022-11-08 DIAGNOSIS — M19.011 LOCALIZED OSTEOARTHRITIS OF RIGHT SHOULDER: ICD-10-CM

## 2022-11-08 PROCEDURE — 99203 OFFICE O/P NEW LOW 30 MIN: CPT | Performed by: FAMILY MEDICINE

## 2022-11-08 NOTE — PROGRESS NOTES
[facility], [place]  PRIMARY CARE SPORTS MEDICINE  DATE OF VISIT : 2022    Patient : Alisia Arango  Age : 40 y.o.  : 1980  MRN : [id number]   ______________________________________________________________________    Chief Complaint :   Chief Complaint   Patient presents with    Shoulder Pain     Rt shld pain x couple week, h/o surgery , SLAP tear + biceps release  - pain is anterior, shooting pain with \"quick movement\" , discomfort all the time, when elbow is above the head / reaching upward is bothersome    X-ray      Rt shld XR obtained     HPI : Alisia Arango is 40 y.o. male who presented to the clinic today for evaluation of right shoulder pain. Onset of the symptoms was about 10 years ago, with no known mechanism of injury. Patient has a history of SLAP repair and BT. Current symptoms include lateral shoulder pain and limited range of motion. Pain is aggravated by any weight bearing especially deep squats. Evaluation to date: XRs of the right shoulder which demonstrate no acute fractures or dislocations but some degenerative change of the glenohumeral joint. Treatment to date: avoidance of offending activity and OTC analgesics which are not very effective. Past Medical History :  No past medical history on file. Past Surgical History:   Procedure Laterality Date    SHOULDER SURGERY Right     labral tear    WISDOM TOOTH EXTRACTION         Allergies :   No Known Allergies    Medication List :    Current Outpatient Medications   Medication Sig Dispense Refill    lisinopril (PRINIVIL;ZESTRIL) 20 MG tablet Take 1 tablet by mouth daily 30 tablet 1     No current facility-administered medications for this visit.      ______________________________________________________________________    Physical Exam :    Vitals: Ht 5' 7\" (1.702 m)   Wt 180 lb (81.6 kg) Comment: per pt  BMI 28.19 kg/m²   General Appearance: Nontoxic, awake, alert, and in no acute distress.   Chest wall/Lung: Respirations regular and unlabored. No cyanosis. Heart: RRR, distal pulses intact. Neurologic: Alert&Oriented x3. Sensation grossly intact. No focal motor deficits detected. Musculokeletal: RIGHT Shoulder:  ROM: , , ER 45, IR Lumbar. No obvious bony deformity. No ecchymosis. TTP: ( - ) AC joint, ( - ) Biceps, ( - ) Coracoid, ( - ) Posterior Joint Line  Impingement Tests: ( + ) Rodriguez, ( + ) Neer's  Labrum: ( + ) Driscoll's, ( + ) Speeds  Rotator cuff: ( + ) Full can, ( - ) Bear hug, ( - ) Belly press, ( - ) ER weakness   ______________________________________________________________________    Assessment & Plan :    1. Chronic right shoulder pain  2. Tendinitis of right rotator cuff  3. Rotator cuff impingement syndrome of right shoulder  4. Localized osteoarthritis of right shoulder  Patient presents to the office today for evaluation of right shoulder pain. History, referring provider note, physical exam and imaging (as interpreted by me) are consistent with glenohumeral arthritis with superimposed rotator cuff tendinitis/impingement syndrome of the right shoulder. Treatment options discussed with patient in the office today including activity modification, oral anti-inflammatories, physical therapy, injection options, advanced imaging in the form of a MRI and referral to an orthopedic surgeon for discussion of surgical opinion. Patient wishes to proceed with conservative treatment in the form of continued home exercise program and OTC anti-inflammatories. Patient will follow up if symptoms persist and he wishes to pursue escalation of treatment. Patient is agreeable with above plan all questions and concerns were addressed in the office today. Return to Office: Return if symptoms worsen or fail to improve.     Nate Lorenzo MD

## 2022-12-20 RX ORDER — LISINOPRIL 20 MG/1
20 TABLET ORAL DAILY
Qty: 90 TABLET | Refills: 3 | Status: SHIPPED | OUTPATIENT
Start: 2022-12-20

## 2022-12-29 RX ORDER — LISINOPRIL 20 MG/1
20 TABLET ORAL DAILY
Qty: 90 TABLET | Refills: 3 | Status: SHIPPED | OUTPATIENT
Start: 2022-12-29

## 2023-02-20 ENCOUNTER — OFFICE VISIT (OUTPATIENT)
Dept: FAMILY MEDICINE CLINIC | Age: 43
End: 2023-02-20
Payer: COMMERCIAL

## 2023-02-20 VITALS
TEMPERATURE: 97.1 F | BODY MASS INDEX: 29.84 KG/M2 | RESPIRATION RATE: 16 BRPM | OXYGEN SATURATION: 98 % | DIASTOLIC BLOOD PRESSURE: 80 MMHG | WEIGHT: 190.1 LBS | HEART RATE: 86 BPM | SYSTOLIC BLOOD PRESSURE: 122 MMHG | HEIGHT: 67 IN

## 2023-02-20 DIAGNOSIS — I10 PRIMARY HYPERTENSION: ICD-10-CM

## 2023-02-20 PROCEDURE — 3074F SYST BP LT 130 MM HG: CPT | Performed by: FAMILY MEDICINE

## 2023-02-20 PROCEDURE — 3078F DIAST BP <80 MM HG: CPT | Performed by: FAMILY MEDICINE

## 2023-02-20 PROCEDURE — 99214 OFFICE O/P EST MOD 30 MIN: CPT | Performed by: FAMILY MEDICINE

## 2023-02-20 RX ORDER — AMLODIPINE BESYLATE 5 MG/1
5 TABLET ORAL DAILY
Qty: 30 TABLET | Refills: 0 | Status: SHIPPED | OUTPATIENT
Start: 2023-02-20

## 2023-02-20 SDOH — ECONOMIC STABILITY: INCOME INSECURITY: HOW HARD IS IT FOR YOU TO PAY FOR THE VERY BASICS LIKE FOOD, HOUSING, MEDICAL CARE, AND HEATING?: NOT VERY HARD

## 2023-02-20 SDOH — ECONOMIC STABILITY: FOOD INSECURITY: WITHIN THE PAST 12 MONTHS, YOU WORRIED THAT YOUR FOOD WOULD RUN OUT BEFORE YOU GOT MONEY TO BUY MORE.: NEVER TRUE

## 2023-02-20 SDOH — ECONOMIC STABILITY: HOUSING INSECURITY
IN THE LAST 12 MONTHS, WAS THERE A TIME WHEN YOU DID NOT HAVE A STEADY PLACE TO SLEEP OR SLEPT IN A SHELTER (INCLUDING NOW)?: NO

## 2023-02-20 SDOH — ECONOMIC STABILITY: FOOD INSECURITY: WITHIN THE PAST 12 MONTHS, THE FOOD YOU BOUGHT JUST DIDN'T LAST AND YOU DIDN'T HAVE MONEY TO GET MORE.: NEVER TRUE

## 2023-02-20 ASSESSMENT — PATIENT HEALTH QUESTIONNAIRE - PHQ9
SUM OF ALL RESPONSES TO PHQ QUESTIONS 1-9: 0
SUM OF ALL RESPONSES TO PHQ9 QUESTIONS 1 & 2: 0
SUM OF ALL RESPONSES TO PHQ QUESTIONS 1-9: 0
SUM OF ALL RESPONSES TO PHQ QUESTIONS 1-9: 0
2. FEELING DOWN, DEPRESSED OR HOPELESS: 0
SUM OF ALL RESPONSES TO PHQ QUESTIONS 1-9: 0
1. LITTLE INTEREST OR PLEASURE IN DOING THINGS: 0

## 2023-02-20 NOTE — PROGRESS NOTES
2/20/2023    Chief Complaint   Patient presents with    Follow-up     Blood pressure       Magno Fraser is a 40 y.o. patient that presents today for:    Hypertension: Here for follow up chronic hypertension. Patient is  compliant with lifestyle modifications like exercise and adherence to a low salt diet. Patient is  well controlled. Denies chest pain, diaphoresis, dyspnea, dyspnea on exertion, peripheral edema, palpitations, HA, visual issues. Med list reviewed. Taking as prescribed. Cough with Lisinopril. Feeling well otherwise, no complaints. Patient's past medical, surgical, social and/or family history reviewed, updated in chart, and are non-contributory (unless otherwise stated). Medications and allergies also reviewed and updated in chart. ROS negative unless otherwise specified    Physical Exam  Wt Readings from Last 3 Encounters:   02/20/23 190 lb 1.6 oz (86.2 kg)   11/08/22 180 lb (81.6 kg)   11/02/22 183 lb 9.6 oz (83.3 kg)     Temp Readings from Last 3 Encounters:   02/20/23 97.1 °F (36.2 °C) (Temporal)   11/02/22 97.5 °F (36.4 °C)   07/14/22 97.5 °F (36.4 °C)     BP Readings from Last 3 Encounters:   02/20/23 122/80   11/02/22 (!) 142/94   07/14/22 128/88     Pulse Readings from Last 3 Encounters:   02/20/23 86   11/02/22 75   07/14/22 94       General appearance: alert, well appearing, and in no distress, oriented to person, place, and time and normal appearing weight. CVS exam: normal rate, regular rhythm, normal S1, S2, no murmurs, rubs, clicks or gallops. Radial pulses 2+ bilateral.  PT/DP pulse 2+ bilat. No C/C/E    Chest: clear to auscultation, no wheezes, rales or rhonchi, symmetric air entry.      Abdomen: Soft, non-tender, non-distended, positive BS in all 4 quadrants    Extremities:Dorsalis pedis pulses palpated bilaterally, no clubbing, cyanosis, edema or erythema     SKIN: warm, dry, no lesions, jaundice, petechiae, pallor, cyanosis, ecchymosis    NEURO: gross motor exam normal by observation, gait normal    Mental status - alert, oriented to person, place, and time, normal mood, behavior, speech, dress, motor activity, and thought processes      Assessment/Plan  Abbie Walker was seen today for follow-up. Diagnoses and all orders for this visit:    Primary hypertension  -     Basic Metabolic Panel; Future  -     CBC with Auto Differential; Future  -     Lipid Panel; Future  -     START:amLODIPine (NORVASC) 5 MG tablet; Take 1 tablet by mouth daily   - STOP: Lisinopril      Return in about 6 months (around 8/20/2023). Call or go to ED immediately if symptoms worsen or persist.    Educational materials and/or home exercises printed for patient's review and were included in patient instructions on his/her After Visit Summary and given to patient at the end of visit. Counseled regarding above diagnosis, including possible risks and complications,  especially if left uncontrolled. Counseled regarding the possible side effects, risks, benefits and alternatives to treatment; patient and/or guardian verbalizes understanding, agrees, feels comfortable with and wishes to proceed with above treatment plan. Advised patient to call with any new medication issues, and read all Rx info from pharmacy to assure aware of all possible risks and side effects of medication before taking. Reviewed age and gender appropriate health screening exams and vaccinations. Advised patient regarding importance of keeping up with recommended health maintenance and to schedule as soon as possible if overdue, as this is important in assessing for undiagnosed pathology, especially cancer, as well as protecting against potentially harmful/life threatening disease. Patient and/or guardian verbalizes understanding and agrees with above counseling, assessment and plan. All questions answered.       Whitney Cardona, DO

## 2023-03-13 DIAGNOSIS — I10 PRIMARY HYPERTENSION: ICD-10-CM

## 2023-03-13 RX ORDER — AMLODIPINE BESYLATE 5 MG/1
5 TABLET ORAL DAILY
Qty: 90 TABLET | Refills: 2 | Status: SHIPPED | OUTPATIENT
Start: 2023-03-13

## 2023-09-05 ENCOUNTER — TELEPHONE (OUTPATIENT)
Dept: FAMILY MEDICINE CLINIC | Age: 43
End: 2023-09-05

## 2023-09-05 NOTE — TELEPHONE ENCOUNTER
----- Message from Joellen Mcnally sent at 9/5/2023 10:23 AM EDT -----  Subject: Appointment Request    Reason for Call: Established Patient Appointment needed: Routine Physical   Exam    QUESTIONS    Reason for appointment request? Available appointments did not meet   patient need     Additional Information for Provider? Patient calling in to reschedule his   physical on 9/6/23 @ 8:15am. Patients states he did not have the blood   work completed forgot. Patient would like to make sure that blood work is   still good to have completed and schedule an earily appointment.  please   contact patient and let him know if ok to do bloodwork or if a new script   for labs needs to be completed  ---------------------------------------------------------------------------  --------------  Shelley ALEJANDRO  [phone]; OK to leave message on voicemail  ---------------------------------------------------------------------------  --------------  SCRIPT ANSWERS

## 2023-09-06 DIAGNOSIS — I10 PRIMARY HYPERTENSION: ICD-10-CM

## 2023-09-06 RX ORDER — AMLODIPINE BESYLATE 5 MG/1
5 TABLET ORAL DAILY
Qty: 90 TABLET | Refills: 2 | Status: SHIPPED | OUTPATIENT
Start: 2023-09-06

## 2023-09-22 ENCOUNTER — HOSPITAL ENCOUNTER (OUTPATIENT)
Age: 43
Discharge: HOME | End: 2023-09-22
Payer: COMMERCIAL

## 2023-09-22 LAB
ANION GAP SERPL CALCULATED.3IONS-SCNC: 11 MMOL/L (ref 7–16)
BASOPHILS # BLD: 0.03 K/UL (ref 0–0.2)
BASOPHILS NFR BLD: 1 % (ref 0–2)
BUN SERPL-MCNC: 14 MG/DL (ref 6–20)
CALCIUM SERPL-MCNC: 9.8 MG/DL (ref 8.6–10.2)
CHLORIDE SERPL-SCNC: 102 MMOL/L (ref 98–107)
CHOLEST SERPL-MCNC: 193 MG/DL
CO2 SERPL-SCNC: 25 MMOL/L (ref 22–29)
CREAT SERPL-MCNC: 1 MG/DL (ref 0.7–1.2)
EOSINOPHIL # BLD: 0.13 K/UL (ref 0.05–0.5)
EOSINOPHILS RELATIVE PERCENT: 2 % (ref 0–6)
ERYTHROCYTE [DISTWIDTH] IN BLOOD BY AUTOMATED COUNT: 11.9 % (ref 11.5–15)
GFR SERPL CREATININE-BSD FRML MDRD: >60 ML/MIN/1.73M2
GLUCOSE SERPL-MCNC: 109 MG/DL (ref 74–99)
HCT VFR BLD AUTO: 46.1 % (ref 37–54)
HDLC SERPL-MCNC: 41 MG/DL
HGB BLD-MCNC: 16.2 G/DL (ref 12.5–16.5)
IMM GRANULOCYTES # BLD AUTO: <0.03 K/UL (ref 0–0.58)
IMM GRANULOCYTES NFR BLD: 0 % (ref 0–5)
LDLC SERPL CALC-MCNC: 116 MG/DL
LYMPHOCYTES NFR BLD: 2.64 K/UL (ref 1.5–4)
LYMPHOCYTES RELATIVE PERCENT: 44 % (ref 20–42)
MCH RBC QN AUTO: 30.3 PG (ref 26–35)
MCHC RBC AUTO-ENTMCNC: 35.1 G/DL (ref 32–34.5)
MCV RBC AUTO: 86.2 FL (ref 80–99.9)
MONOCYTES NFR BLD: 0.54 K/UL (ref 0.1–0.95)
MONOCYTES NFR BLD: 9 % (ref 2–12)
NEUTROPHILS NFR BLD: 44 % (ref 43–80)
NEUTS SEG NFR BLD: 2.66 K/UL (ref 1.8–7.3)
PLATELET # BLD AUTO: 253 K/UL (ref 130–450)
PMV BLD AUTO: 9.3 FL (ref 7–12)
POTASSIUM SERPL-SCNC: 3.9 MMOL/L (ref 3.5–5)
RBC # BLD AUTO: 5.35 M/UL (ref 3.8–5.8)
SODIUM SERPL-SCNC: 138 MMOL/L (ref 132–146)
TRIGL SERPL-MCNC: 181 MG/DL
VLDLC SERPL CALC-MCNC: 36 MG/DL
WBC OTHER # BLD: 6 K/UL (ref 4.5–11.5)

## 2023-09-22 PROCEDURE — 80061 LIPID PANEL: CPT

## 2023-09-22 PROCEDURE — 85025 COMPLETE CBC W/AUTO DIFF WBC: CPT

## 2023-09-22 PROCEDURE — 36415 COLL VENOUS BLD VENIPUNCTURE: CPT

## 2023-09-22 PROCEDURE — 80048 BASIC METABOLIC PNL TOTAL CA: CPT

## 2023-10-04 ENCOUNTER — OFFICE VISIT (OUTPATIENT)
Dept: FAMILY MEDICINE CLINIC | Age: 43
End: 2023-10-04
Payer: COMMERCIAL

## 2023-10-04 VITALS
BODY MASS INDEX: 29.68 KG/M2 | HEART RATE: 73 BPM | HEIGHT: 67 IN | WEIGHT: 189.1 LBS | RESPIRATION RATE: 18 BRPM | DIASTOLIC BLOOD PRESSURE: 88 MMHG | OXYGEN SATURATION: 97 % | SYSTOLIC BLOOD PRESSURE: 128 MMHG | TEMPERATURE: 97.4 F

## 2023-10-04 DIAGNOSIS — R73.01 IFG (IMPAIRED FASTING GLUCOSE): ICD-10-CM

## 2023-10-04 DIAGNOSIS — Z00.00 ANNUAL PHYSICAL EXAM: Primary | ICD-10-CM

## 2023-10-04 LAB — HBA1C MFR BLD: 5 %

## 2023-10-04 PROCEDURE — 99396 PREV VISIT EST AGE 40-64: CPT | Performed by: FAMILY MEDICINE

## 2023-10-04 PROCEDURE — 83036 HEMOGLOBIN GLYCOSYLATED A1C: CPT | Performed by: FAMILY MEDICINE

## 2023-10-04 PROCEDURE — 3078F DIAST BP <80 MM HG: CPT | Performed by: FAMILY MEDICINE

## 2023-10-04 PROCEDURE — 3074F SYST BP LT 130 MM HG: CPT | Performed by: FAMILY MEDICINE

## 2023-10-04 NOTE — PROGRESS NOTES
POCT glycosylated hemoglobin (Hb A1C)        Discussed preventive care and screenings, lab results and the importance of diet and exercise. Advised to return to the office for annual exam or sooner if needed. Return in about 1 year (around 10/4/2024), or if symptoms worsen or fail to improve. Call or go to ED immediately if symptoms worsen or persist.    Educational materials and/or home exercises printed for patient's review and were included in patient instructions on his/her After Visit Summary and given to patient at the end of visit. Counseled regarding above diagnosis, including possible risks and complications,  especially if left uncontrolled. Counseled regarding the possible side effects, risks, benefits and alternatives to treatment; patient and/or guardian verbalizes understanding, agrees, feels comfortable with and wishes to proceed with above treatment plan. Advised patient to call with any new medication issues, and read all Rx info from pharmacy to assure aware of all possible risks and side effects of medication before taking. Reviewed age and gender appropriate health screening exams and vaccinations. Advised patient regarding importance of keeping up with recommended health maintenance and to schedule as soon as possible if overdue, as this is important in assessing for undiagnosed pathology, especially cancer, as well as protecting against potentially harmful/life threatening disease. Patient and/or guardian verbalizes understanding and agrees with above counseling, assessment and plan. All questions answered.

## 2023-12-10 DIAGNOSIS — I10 PRIMARY HYPERTENSION: ICD-10-CM

## 2023-12-11 RX ORDER — AMLODIPINE BESYLATE 5 MG/1
5 TABLET ORAL DAILY
Qty: 90 TABLET | Refills: 2 | Status: SHIPPED | OUTPATIENT
Start: 2023-12-11

## 2024-03-17 DIAGNOSIS — I10 PRIMARY HYPERTENSION: ICD-10-CM

## 2024-03-18 RX ORDER — AMLODIPINE BESYLATE 5 MG/1
5 TABLET ORAL DAILY
Qty: 90 TABLET | Refills: 2 | Status: SHIPPED | OUTPATIENT
Start: 2024-03-18

## 2024-05-31 ENCOUNTER — HOSPITAL ENCOUNTER (EMERGENCY)
Age: 44
Discharge: HOME | End: 2024-05-31
Payer: COMMERCIAL

## 2024-05-31 ENCOUNTER — APPOINTMENT (OUTPATIENT)
Dept: GENERAL RADIOLOGY | Age: 44
End: 2024-05-31
Payer: COMMERCIAL

## 2024-05-31 VITALS
HEART RATE: 78 BPM | WEIGHT: 190 LBS | DIASTOLIC BLOOD PRESSURE: 94 MMHG | BODY MASS INDEX: 29.76 KG/M2 | SYSTOLIC BLOOD PRESSURE: 131 MMHG | TEMPERATURE: 98.2 F | OXYGEN SATURATION: 98 % | RESPIRATION RATE: 18 BRPM

## 2024-05-31 DIAGNOSIS — S93.402A SPRAIN OF LEFT ANKLE, UNSPECIFIED LIGAMENT, INITIAL ENCOUNTER: Primary | ICD-10-CM

## 2024-05-31 PROCEDURE — 99211 OFF/OP EST MAY X REQ PHY/QHP: CPT

## 2024-05-31 PROCEDURE — 73610 X-RAY EXAM OF ANKLE: CPT

## 2024-05-31 ASSESSMENT — PAIN - FUNCTIONAL ASSESSMENT: PAIN_FUNCTIONAL_ASSESSMENT: NONE - DENIES PAIN

## 2024-05-31 NOTE — DISCHARGE INSTRUCTIONS
Continue to wear ankle brace and may use hightop shoes or boots to help with stability.  Take ibuprofen for pain and inflammation.  Activity as tolerated.  Recommend close follow-up with occupational health within 1 week.  Instructions given.

## 2024-05-31 NOTE — ED PROVIDER NOTES
[facility] URGENT CARE  EMERGENCY DEPARTMENT ENCOUNTER        NAME: Bernard Bearden  :  1980  MRN:  [id number]  Date of evaluation: 2024  Provider: David Flores PA-C  PCP: Whitney Gonzalez DO  Note Started : 10:44 AM EDT 24    Chief Complaint: Ankle Pain (Left ankle injury a week ago at work, has only gotten slightly better with conservative treatment)      This is a 43-year-old male that presents to urgent care complaining of continued left ankle pain and swelling.  He states on 2024 he was at work and he took a step backward and stepped onto a hose and injured his left ankle.  He denies any other limb injury no head or neck pain.  Has been doing conservative therapy including using an ankle brace but still is having pain and stiffness.  No numbness or tingling.  On first contact patient he appears to be in no acute distress.        Review of Systems  Pertinent positives and negatives are stated within HPI, all other systems reviewed and are negative.     Allergies: Patient has no known allergies.     --------------------------------------------- PAST HISTORY ---------------------------------------------  Past Medical History:  has no past medical history on file.    Past Surgical History:  has a past surgical history that includes [place] tooth extraction and shoulder surgery (Right).    Social History:  reports that he has never smoked. He has never used smokeless tobacco. He reports current alcohol use. He reports that he does not use drugs.    Family History: family history includes Breast Cancer in his maternal grandmother and paternal grandmother; Clotting Disorder in his sister; High Blood Pressure in his father, maternal grandfather, maternal grandmother, paternal grandfather, and paternal grandmother; High Cholesterol in his father; Other in his paternal grandfather.     The patient’s home medications have been reviewed.    The nursing notes within the ED encounter

## 2024-06-07 DIAGNOSIS — I10 PRIMARY HYPERTENSION: ICD-10-CM

## 2024-06-07 RX ORDER — AMLODIPINE BESYLATE 5 MG/1
5 TABLET ORAL DAILY
Qty: 90 TABLET | Refills: 2 | Status: SHIPPED | OUTPATIENT
Start: 2024-06-07

## 2024-09-20 DIAGNOSIS — I10 PRIMARY HYPERTENSION: ICD-10-CM

## 2024-09-20 RX ORDER — AMLODIPINE BESYLATE 5 MG/1
5 TABLET ORAL DAILY
Qty: 90 TABLET | Refills: 2 | Status: SHIPPED | OUTPATIENT
Start: 2024-09-20

## 2024-12-27 DIAGNOSIS — I10 PRIMARY HYPERTENSION: ICD-10-CM

## 2024-12-27 RX ORDER — AMLODIPINE BESYLATE 5 MG/1
5 TABLET ORAL DAILY
Qty: 90 TABLET | Refills: 2 | OUTPATIENT
Start: 2024-12-27

## 2025-01-06 ENCOUNTER — PATIENT MESSAGE (OUTPATIENT)
Dept: FAMILY MEDICINE CLINIC | Age: 45
End: 2025-01-06

## 2025-01-07 DIAGNOSIS — I10 PRIMARY HYPERTENSION: ICD-10-CM

## 2025-01-07 RX ORDER — AMLODIPINE BESYLATE 5 MG/1
5 TABLET ORAL DAILY
Qty: 90 TABLET | Refills: 2 | Status: CANCELLED | OUTPATIENT
Start: 2025-01-07

## 2025-01-07 NOTE — TELEPHONE ENCOUNTER
Called patient to advise we do have a few openings today so that he could be seen , vm has been left.   Waiting on pharmacy location and a 30 day supply will be sent in

## 2025-01-13 DIAGNOSIS — I10 PRIMARY HYPERTENSION: Primary | ICD-10-CM

## 2025-01-13 DIAGNOSIS — I10 PRIMARY HYPERTENSION: ICD-10-CM

## 2025-01-13 RX ORDER — AMLODIPINE BESYLATE 5 MG/1
5 TABLET ORAL DAILY
Qty: 90 TABLET | Refills: 2 | Status: SHIPPED | OUTPATIENT
Start: 2025-01-13

## 2025-01-13 RX ORDER — LISINOPRIL 20 MG/1
20 TABLET ORAL DAILY
Qty: 90 TABLET | Refills: 3 | OUTPATIENT
Start: 2025-01-13

## 2025-01-16 ENCOUNTER — HOSPITAL ENCOUNTER (OUTPATIENT)
Age: 45
Discharge: HOME | End: 2025-01-16
Payer: COMMERCIAL

## 2025-01-16 DIAGNOSIS — I10 PRIMARY HYPERTENSION: ICD-10-CM

## 2025-01-16 LAB
ALBUMIN SERPL-MCNC: 4.5 G/DL (ref 3.5–5.2)
ALP SERPL-CCNC: 80 U/L (ref 40–129)
ALT SERPL-CCNC: 67 U/L (ref 0–40)
ANION GAP SERPL CALCULATED.3IONS-SCNC: 12 MMOL/L (ref 7–16)
AST SERPL-CCNC: 39 U/L (ref 0–39)
BASOPHILS # BLD: 0.03 K/UL (ref 0–0.2)
BASOPHILS NFR BLD: 1 % (ref 0–2)
BILIRUB SERPL-MCNC: 0.4 MG/DL (ref 0–1.2)
BUN SERPL-MCNC: 13 MG/DL (ref 6–20)
CALCIUM SERPL-MCNC: 10.1 MG/DL (ref 8.6–10.2)
CHLORIDE SERPL-SCNC: 102 MMOL/L (ref 98–107)
CHOLEST SERPL-MCNC: 168 MG/DL
CO2 SERPL-SCNC: 27 MMOL/L (ref 22–29)
CREAT SERPL-MCNC: 1.1 MG/DL (ref 0.7–1.2)
EOSINOPHIL # BLD: 0.11 K/UL (ref 0.05–0.5)
EOSINOPHILS RELATIVE PERCENT: 2 % (ref 0–6)
ERYTHROCYTE [DISTWIDTH] IN BLOOD BY AUTOMATED COUNT: 11.8 % (ref 11.5–15)
GFR, ESTIMATED: 85 ML/MIN/1.73M2
GLUCOSE SERPL-MCNC: 97 MG/DL (ref 74–99)
HCT VFR BLD AUTO: 42.7 % (ref 37–54)
HDLC SERPL-MCNC: 32 MG/DL
HGB BLD-MCNC: 14.8 G/DL (ref 12.5–16.5)
IMM GRANULOCYTES # BLD AUTO: 0.05 K/UL (ref 0–0.58)
IMM GRANULOCYTES NFR BLD: 1 % (ref 0–5)
LDLC SERPL CALC-MCNC: 101 MG/DL
LYMPHOCYTES NFR BLD: 1.81 K/UL (ref 1.5–4)
LYMPHOCYTES RELATIVE PERCENT: 36 % (ref 20–42)
MCH RBC QN AUTO: 29.5 PG (ref 26–35)
MCHC RBC AUTO-ENTMCNC: 34.7 G/DL (ref 32–34.5)
MCV RBC AUTO: 85.2 FL (ref 80–99.9)
MONOCYTES NFR BLD: 0.56 K/UL (ref 0.1–0.95)
MONOCYTES NFR BLD: 11 % (ref 2–12)
NEUTROPHILS NFR BLD: 49 % (ref 43–80)
NEUTS SEG NFR BLD: 2.46 K/UL (ref 1.8–7.3)
PLATELET # BLD AUTO: 325 K/UL (ref 130–450)
PMV BLD AUTO: 8.6 FL (ref 7–12)
POTASSIUM SERPL-SCNC: 4.7 MMOL/L (ref 3.5–5)
PROT SERPL-MCNC: 7.6 G/DL (ref 6.4–8.3)
RBC # BLD AUTO: 5.01 M/UL (ref 3.8–5.8)
SODIUM SERPL-SCNC: 141 MMOL/L (ref 132–146)
TRIGL SERPL-MCNC: 177 MG/DL
VLDLC SERPL CALC-MCNC: 35 MG/DL
WBC OTHER # BLD: 5 K/UL (ref 4.5–11.5)

## 2025-01-16 PROCEDURE — 80061 LIPID PANEL: CPT

## 2025-01-16 PROCEDURE — 85025 COMPLETE CBC W/AUTO DIFF WBC: CPT

## 2025-01-16 PROCEDURE — 80053 COMPREHEN METABOLIC PANEL: CPT

## 2025-01-16 PROCEDURE — 36415 COLL VENOUS BLD VENIPUNCTURE: CPT

## 2025-01-28 ENCOUNTER — OFFICE VISIT (OUTPATIENT)
Dept: FAMILY MEDICINE CLINIC | Age: 45
End: 2025-01-28
Payer: COMMERCIAL

## 2025-01-28 VITALS
TEMPERATURE: 97.1 F | SYSTOLIC BLOOD PRESSURE: 138 MMHG | HEIGHT: 67 IN | WEIGHT: 189.9 LBS | DIASTOLIC BLOOD PRESSURE: 86 MMHG | BODY MASS INDEX: 29.81 KG/M2 | HEART RATE: 72 BPM | OXYGEN SATURATION: 98 % | RESPIRATION RATE: 16 BRPM

## 2025-01-28 DIAGNOSIS — Z00.00 ENCOUNTER FOR WELL ADULT EXAM WITHOUT ABNORMAL FINDINGS: Primary | ICD-10-CM

## 2025-01-28 DIAGNOSIS — R74.8 ELEVATED LIVER ENZYMES: ICD-10-CM

## 2025-01-28 DIAGNOSIS — I10 PRIMARY HYPERTENSION: ICD-10-CM

## 2025-01-28 PROCEDURE — 99396 PREV VISIT EST AGE 40-64: CPT | Performed by: FAMILY MEDICINE

## 2025-01-28 PROCEDURE — 3079F DIAST BP 80-89 MM HG: CPT | Performed by: FAMILY MEDICINE

## 2025-01-28 PROCEDURE — 3075F SYST BP GE 130 - 139MM HG: CPT | Performed by: FAMILY MEDICINE

## 2025-01-28 SDOH — ECONOMIC STABILITY: FOOD INSECURITY: WITHIN THE PAST 12 MONTHS, YOU WORRIED THAT YOUR FOOD WOULD RUN OUT BEFORE YOU GOT MONEY TO BUY MORE.: NEVER TRUE

## 2025-01-28 SDOH — ECONOMIC STABILITY: FOOD INSECURITY: WITHIN THE PAST 12 MONTHS, THE FOOD YOU BOUGHT JUST DIDN'T LAST AND YOU DIDN'T HAVE MONEY TO GET MORE.: NEVER TRUE

## 2025-01-28 ASSESSMENT — PATIENT HEALTH QUESTIONNAIRE - PHQ9
SUM OF ALL RESPONSES TO PHQ9 QUESTIONS 1 & 2: 0
SUM OF ALL RESPONSES TO PHQ QUESTIONS 1-9: 0
2. FEELING DOWN, DEPRESSED OR HOPELESS: NOT AT ALL
SUM OF ALL RESPONSES TO PHQ QUESTIONS 1-9: 0
1. LITTLE INTEREST OR PLEASURE IN DOING THINGS: NOT AT ALL
1. LITTLE INTEREST OR PLEASURE IN DOING THINGS: NOT AT ALL
SUM OF ALL RESPONSES TO PHQ QUESTIONS 1-9: 0
2. FEELING DOWN, DEPRESSED OR HOPELESS: NOT AT ALL
SUM OF ALL RESPONSES TO PHQ9 QUESTIONS 1 & 2: 0
SUM OF ALL RESPONSES TO PHQ QUESTIONS 1-9: 0

## 2025-01-28 NOTE — PATIENT INSTRUCTIONS
Well Visit, Ages 18 to 65: Care Instructions  Well visits can help you stay healthy. Your doctor has checked your overall health and may have suggested ways to take good care of yourself. Your doctor also may have recommended tests. You can help prevent illness with healthy eating, good sleep, vaccinations, regular exercise, and other steps.    Get the tests that you and your doctor decide on. Depending on your age and risks, examples might include screening for diabetes; hepatitis C; HIV; and cervical, breast, lung, and colon cancer. Screening helps find diseases before any symptoms appear.   Eat healthy foods. Choose fruits, vegetables, whole grains, lean protein, and low-fat dairy foods. Limit saturated fat and reduce salt.     Limit alcohol. Men should have no more than 2 drinks a day. Women should have no more than 1. For some people, no alcohol is the best choice.   Exercise. Get at least 30 minutes of exercise on most days of the week. Walking can be a good choice.     Reach and stay at your healthy weight. This will lower your risk for many health problems.   Take care of your mental health. Try to stay connected with friends, family, and community, and find ways to manage stress.     If you're feeling depressed or hopeless, talk to someone. A counselor can help. If you don't have a counselor, talk to your doctor.   Talk to your doctor if you think you may have a problem with alcohol or drug use. This includes prescription medicines, marijuana, and other drugs.     Avoid tobacco and nicotine: Don't smoke, vape, or chew. If you need help quitting, talk to your doctor.   Practice safer sex. Getting tested, using condoms or dental dams, and limiting sex partners can help prevent STIs.     Use birth control if it's important to you to prevent pregnancy. Talk with your doctor about your choices and what might be best for you.   Prevent problems where you can. Protect your skin from too much sun, wash your  Patient/Caregiver provided printed discharge information.

## 2025-01-28 NOTE — PROGRESS NOTES
Well Adult Note  Name: Bernrad Bearden Today’s Date: 2025   MRN: [id number] Sex: Male   Age: 44 y.o. Ethnicity: Non- / Non    : 1980 Race: White (non-)      Bernard Bearden is here for a well adult exam.          Assessment & Plan  1. Blood pressure management.  His blood pressure readings are within acceptable limits, but there is room for improvement. The dosage of amlodipine will be increased from 5 mg to 10 mg to achieve a target systolic blood pressure in the 120s. He will take two 5 mg tablets for the next week or two to see how he responds. If the 10 mg dosage causes his blood pressure to drop too much, he can cut the dose in half.    2. Elevated liver enzymes.  His ALT levels are slightly elevated, which could be indicative of fatty liver disease, often associated with elevated triglycerides. He is advised to maintain a low-fat diet and avoid processed foods. A repeat comprehensive metabolic panel (CMP) will be conducted in 3 months to monitor his liver enzymes. This test does not require fasting. If the ALT levels remain elevated, he has the option to commit to a low-fat diet and reassess in 6 months, or to undergo an ultrasound of the right upper quadrant to rule out fatty liver disease.    3. Elevated triglycerides.  His triglyceride levels are consistently elevated, though not at their highest. He is advised to reduce carbohydrate intake and increase consumption of lean meats, vegetables, proteins, fruits, and nuts. He should also avoid refined processed sugars and carbs.    4. Health maintenance.  His weight is stable, and his BMI is 29. He is due for a colonoscopy in . He is encouraged to continue intermittent fasting or intuitive eating if it suits him. He is also advised to see an eye doctor, as it has been 18 months since his last visit.    Encounter for well adult exam without abnormal findings  -     Lipid Panel; Future  -     Comprehensive Metabolic Panel;

## 2025-03-02 DIAGNOSIS — I10 PRIMARY HYPERTENSION: ICD-10-CM

## 2025-03-04 RX ORDER — AMLODIPINE BESYLATE 10 MG/1
10 TABLET ORAL DAILY
Qty: 90 TABLET | Refills: 2 | Status: SHIPPED | OUTPATIENT
Start: 2025-03-04

## 2025-04-25 ENCOUNTER — HOSPITAL ENCOUNTER (OUTPATIENT)
Age: 45
Discharge: HOME | End: 2025-04-25
Payer: COMMERCIAL

## 2025-04-25 DIAGNOSIS — R74.8 ELEVATED LIVER ENZYMES: ICD-10-CM

## 2025-04-25 DIAGNOSIS — Z00.00 ENCOUNTER FOR WELL ADULT EXAM WITHOUT ABNORMAL FINDINGS: ICD-10-CM

## 2025-04-25 LAB
ALBUMIN SERPL-MCNC: 4.7 G/DL (ref 3.5–5.2)
ALP SERPL-CCNC: 67 U/L (ref 40–129)
ALT SERPL-CCNC: 51 U/L (ref 0–40)
ANION GAP SERPL CALCULATED.3IONS-SCNC: 13 MMOL/L (ref 7–16)
AST SERPL-CCNC: 29 U/L (ref 0–39)
BASOPHILS # BLD: 0.03 K/UL (ref 0–0.2)
BASOPHILS NFR BLD: 1 % (ref 0–2)
BILIRUB SERPL-MCNC: 0.4 MG/DL (ref 0–1.2)
BUN SERPL-MCNC: 17 MG/DL (ref 6–20)
CALCIUM SERPL-MCNC: 9.8 MG/DL (ref 8.6–10.2)
CHLORIDE SERPL-SCNC: 103 MMOL/L (ref 98–107)
CHOLEST SERPL-MCNC: 165 MG/DL
CO2 SERPL-SCNC: 24 MMOL/L (ref 22–29)
CREAT SERPL-MCNC: 1 MG/DL (ref 0.7–1.2)
EOSINOPHIL # BLD: 0.11 K/UL (ref 0.05–0.5)
EOSINOPHILS RELATIVE PERCENT: 2 % (ref 0–6)
ERYTHROCYTE [DISTWIDTH] IN BLOOD BY AUTOMATED COUNT: 11.6 % (ref 11.5–15)
GFR, ESTIMATED: >90 ML/MIN/1.73M2
GLUCOSE SERPL-MCNC: 97 MG/DL (ref 74–99)
HCT VFR BLD AUTO: 44.5 % (ref 37–54)
HDLC SERPL-MCNC: 40 MG/DL
HGB BLD-MCNC: 15.7 G/DL (ref 12.5–16.5)
IMM GRANULOCYTES # BLD AUTO: <0.03 K/UL (ref 0–0.58)
IMM GRANULOCYTES NFR BLD: 0 % (ref 0–5)
LDLC SERPL CALC-MCNC: 105 MG/DL
LYMPHOCYTES NFR BLD: 2.17 K/UL (ref 1.5–4)
LYMPHOCYTES RELATIVE PERCENT: 48 % (ref 20–42)
MCH RBC QN AUTO: 29.9 PG (ref 26–35)
MCHC RBC AUTO-ENTMCNC: 35.3 G/DL (ref 32–34.5)
MCV RBC AUTO: 84.8 FL (ref 80–99.9)
MONOCYTES NFR BLD: 0.43 K/UL (ref 0.1–0.95)
MONOCYTES NFR BLD: 10 % (ref 2–12)
NEUTROPHILS NFR BLD: 39 % (ref 43–80)
NEUTS SEG NFR BLD: 1.74 K/UL (ref 1.8–7.3)
PLATELET # BLD AUTO: 238 K/UL (ref 130–450)
PMV BLD AUTO: 9.1 FL (ref 7–12)
POTASSIUM SERPL-SCNC: 4.2 MMOL/L (ref 3.5–5)
PROT SERPL-MCNC: 7.6 G/DL (ref 6.4–8.3)
RBC # BLD AUTO: 5.25 M/UL (ref 3.8–5.8)
SODIUM SERPL-SCNC: 140 MMOL/L (ref 132–146)
TRIGL SERPL-MCNC: 101 MG/DL
TSH SERPL DL<=0.05 MIU/L-ACNC: 1.16 UIU/ML (ref 0.27–4.2)
VLDLC SERPL CALC-MCNC: 20 MG/DL
WBC OTHER # BLD: 4.5 K/UL (ref 4.5–11.5)

## 2025-04-25 PROCEDURE — 36415 COLL VENOUS BLD VENIPUNCTURE: CPT

## 2025-04-25 PROCEDURE — 80053 COMPREHEN METABOLIC PANEL: CPT

## 2025-04-25 PROCEDURE — 80061 LIPID PANEL: CPT

## 2025-04-25 PROCEDURE — 85025 COMPLETE CBC W/AUTO DIFF WBC: CPT

## 2025-04-25 PROCEDURE — 84443 ASSAY THYROID STIM HORMONE: CPT

## 2025-04-28 ENCOUNTER — RESULTS FOLLOW-UP (OUTPATIENT)
Dept: FAMILY MEDICINE CLINIC | Age: 45
End: 2025-04-28

## 2025-05-24 DIAGNOSIS — I10 PRIMARY HYPERTENSION: ICD-10-CM

## 2025-05-27 RX ORDER — AMLODIPINE BESYLATE 10 MG/1
10 TABLET ORAL DAILY
Qty: 90 TABLET | Refills: 2 | Status: SHIPPED | OUTPATIENT
Start: 2025-05-27

## 2025-08-17 DIAGNOSIS — I10 PRIMARY HYPERTENSION: ICD-10-CM

## 2025-08-18 RX ORDER — AMLODIPINE BESYLATE 10 MG/1
10 TABLET ORAL DAILY
Qty: 90 TABLET | Refills: 2 | Status: SHIPPED | OUTPATIENT
Start: 2025-08-18